# Patient Record
Sex: MALE | Race: WHITE | HISPANIC OR LATINO | Employment: FULL TIME | URBAN - METROPOLITAN AREA
[De-identification: names, ages, dates, MRNs, and addresses within clinical notes are randomized per-mention and may not be internally consistent; named-entity substitution may affect disease eponyms.]

---

## 2017-01-31 ENCOUNTER — HOSPITAL ENCOUNTER (EMERGENCY)
Facility: MEDICAL CENTER | Age: 27
End: 2017-01-31
Attending: EMERGENCY MEDICINE

## 2017-01-31 VITALS
TEMPERATURE: 99.6 F | RESPIRATION RATE: 18 BRPM | OXYGEN SATURATION: 96 % | WEIGHT: 160.94 LBS | HEART RATE: 121 BPM | HEIGHT: 69 IN | DIASTOLIC BLOOD PRESSURE: 88 MMHG | SYSTOLIC BLOOD PRESSURE: 137 MMHG | BODY MASS INDEX: 23.84 KG/M2

## 2017-01-31 DIAGNOSIS — M67.40 GANGLION CYST: ICD-10-CM

## 2017-01-31 PROCEDURE — 99283 EMERGENCY DEPT VISIT LOW MDM: CPT

## 2017-01-31 NOTE — ED AVS SNAPSHOT
1/31/2017          Matteo Ovalle  [address]  [place] [address]    Dear Matteo:    [facility] wants to ensure your discharge home is safe and you or your loved ones have had all your questions answered regarding your care after you leave the hospital.    You may receive a telephone call within two days of your discharge.  This call is to make certain you understand your discharge instructions as well as ensure we provided you with the best care possible during your stay with us.     The call will only last approximately 3-5 minutes and will be done by a nurse.    Once again, we want to ensure your discharge home is safe and that you have a clear understanding of any next steps in your care.  If you have any questions or concerns, please do not hesitate to contact us, we are here for you.  Thank you for choosing [facility] for your healthcare needs.    Sincerely,    Ryan Aviles    [facility]

## 2017-01-31 NOTE — DISCHARGE INSTRUCTIONS
Ganglion Cyst  A ganglion cyst is a noncancerous, fluid-filled lump that occurs near joints or tendons. The ganglion cyst grows out of a joint or the lining of a tendon. It most often develops in the hand or wrist, but it can also develop in the shoulder, elbow, hip, knee, ankle, or foot. The round or oval ganglion cyst can be the size of a pea or larger than a grape. Increased activity may enlarge the size of the cyst because more fluid starts to build up.   CAUSES  It is not known what causes a ganglion cyst to grow. However, it may be related to:  · Inflammation or irritation around the joint.  · An injury.  · Repetitive movements or overuse.  · Arthritis.  RISK FACTORS  Risk factors include:  · Being a woman.  · Being age 20-50.  SIGNS AND SYMPTOMS  Symptoms may include:   · A lump. This most often appears on the hand or wrist, but it can occur in other areas of the body.  · Tingling.  · Pain.  · Numbness.  · Muscle weakness.  · Weak .  · Less movement in a joint.  DIAGNOSIS  Ganglion cysts are most often diagnosed based on a physical exam. Your health care provider will feel the lump and may shine a light alongside it. If it is a ganglion cyst, a light often shines through it. Your health care provider may order an X-ray, ultrasound, or MRI to rule out other conditions.  TREATMENT  Ganglion cysts usually go away on their own without treatment. If pain or other symptoms are involved, treatment may be needed. Treatment is also needed if the ganglion cyst limits your movement or if it gets infected. Treatment may include:  · Wearing a brace or splint on your wrist or finger.  · Taking anti-inflammatory medicine.  · Draining fluid from the lump with a needle (aspiration).  · Injecting a steroid into the joint.  · Surgery to remove the ganglion cyst.  HOME CARE INSTRUCTIONS  · Do not press on the ganglion cyst, poke it with a needle, or hit it.  · Take medicines only as directed by your health care  provider.  · Wear your brace or splint as directed by your health care provider.  · Watch your ganglion cyst for any changes.  · Keep all follow-up visits as directed by your health care provider. This is important.  SEEK MEDICAL CARE IF:  · Your ganglion cyst becomes larger or more painful.  · You have increased redness, red streaks, or swelling.  · You have pus coming from the lump.  · You have weakness or numbness in the affected area.  · You have a fever or chills.     This information is not intended to replace advice given to you by your health care provider. Make sure you discuss any questions you have with your health care provider.     Document Released: 12/15/2001 Document Revised: 01/08/2016 Document Reviewed: 06/02/2015  Elsevier Interactive Patient Education ©2016 Elsevier Inc.

## 2017-01-31 NOTE — ED NOTES
Chief Complaint   Patient presents with   • Lump     L wrist for 1 year       Denies pain, cms intact

## 2017-01-31 NOTE — ED AVS SNAPSHOT
After Visit Summary                                                                                                                Matteo Ovalle   MRN: [id number]    Department:  [facility], Emergency Dept   Date of Visit:  1/31/2017            [facility], Emergency Dept    [address]    [facility] [address]    Phone:  [phone]      You were seen by     Franco Phillips M.D.      Your Diagnosis Was     Ganglion cyst     M67.40       Follow-up Information     1. Follow up with Jordon Beckford M.D..    Specialty:  Orthopaedics    Contact information    [address]  [address]  [facility] [id number] [phone]        Medication Information     Review all of your home medications and newly ordered medications with your primary doctor and/or pharmacist as soon as possible. Follow medication instructions as directed by your doctor and/or pharmacist.     Please keep your complete medication list with you and share with your physician. Update the information when medications are discontinued, doses are changed, or new medications (including over-the-counter products) are added; and carry medication information at all times in the event of emergency situations.               Medication List      ASK your doctor about these medications        Instructions    diphenhydrAMINE 25 MG Tabs   Commonly known as:  BENADRYL    Take 25-50 mg by mouth every 6 hours as needed.   Dose:  25-50 mg                 Discharge Instructions       Ganglion Cyst  A ganglion cyst is a noncancerous, fluid-filled lump that occurs near joints or tendons. The ganglion cyst grows out of a joint or the lining of a tendon. It most often develops in the hand or wrist, but it can also develop in the shoulder, elbow, hip, knee, ankle, or foot. The round or oval ganglion cyst can be the size of a pea or larger than a grape. Increased activity may enlarge the size of the cyst because more fluid starts to build up.      CAUSES  It is not known what causes a ganglion cyst to grow. However, it may be related to:  · Inflammation or irritation around the joint.  · An injury.  · Repetitive movements or overuse.  · Arthritis.  RISK FACTORS  Risk factors include:  · Being a woman.  · Being age 20-50.  SIGNS AND SYMPTOMS  Symptoms may include:   · A lump. This most often appears on the hand or wrist, but it can occur in other areas of the body.  · Tingling.  · Pain.  · Numbness.  · Muscle weakness.  · Weak .  · Less movement in a joint.  DIAGNOSIS  Ganglion cysts are most often diagnosed based on a physical exam. Your health care provider will feel the lump and may shine a light alongside it. If it is a ganglion cyst, a light often shines through it. Your health care provider may order an X-ray, ultrasound, or MRI to rule out other conditions.  TREATMENT  Ganglion cysts usually go away on their own without treatment. If pain or other symptoms are involved, treatment may be needed. Treatment is also needed if the ganglion cyst limits your movement or if it gets infected. Treatment may include:  · Wearing a brace or splint on your wrist or finger.  · Taking anti-inflammatory medicine.  · Draining fluid from the lump with a needle (aspiration).  · Injecting a steroid into the joint.  · Surgery to remove the ganglion cyst.  HOME CARE INSTRUCTIONS  · Do not press on the ganglion cyst, poke it with a needle, or hit it.  · Take medicines only as directed by your health care provider.  · Wear your brace or splint as directed by your health care provider.  · Watch your ganglion cyst for any changes.  · Keep all follow-up visits as directed by your health care provider. This is important.  SEEK MEDICAL CARE IF:  · Your ganglion cyst becomes larger or more painful.  · You have increased redness, red streaks, or swelling.  · You have pus coming from the lump.  · You have weakness or numbness in the affected area.  · You have a fever or chills.      This information is not intended to replace advice given to you by your health care provider. Make sure you discuss any questions you have with your health care provider.     Document Released: 12/15/2001 Document Revised: 01/08/2016 Document Reviewed: 06/02/2015  Elsevier Interactive Patient Education ©2016 [employer]            Patient Information     Patient Information    Following emergency treatment: all patient requiring follow-up care must return either to a private physician or a clinic if your condition worsens before you are able to obtain further medical attention, please return to the emergency room.     Billing Information    At [facility], we work to make the billing process streamlined for our patients.  Our Representatives are here to answer any questions you may have regarding your hospital bill.  If you have insurance coverage and have supplied your insurance information to us, we will submit a claim to your insurer on your behalf.  Should you have any questions regarding your bill, we can be reached online or by phone as follows:  Online: You are able pay your bills online or live chat with our representatives about any billing questions you may have. We are here to help Monday - Friday from 8:00am to 7:30pm and 9:00am - 12:00pm on Saturdays.  Please visit [email or web address]  for more information.   Phone:  [phone] or [phone]    Please note that your emergency physician, surgeon, pathologist, radiologist, anesthesiologist, and other specialists are not employed by [facility] and will therefore bill separately for their services.  Please contact them directly for any questions concerning their bills at the numbers below:     Emergency Physician Services:  [phone]  [facility]:  [phone]  [facility]:  [phone]  [facility]:  [phone]    1. Your final bill may vary from the amount  quoted upon discharge if all procedures are not complete at that time, or if your doctor has additional procedures of which we are not aware. You will receive an additional bill if you return to the Emergency Department at [facility] for suture removal regardless of the facility of which the sutures were placed.     2. Please arrange for settlement of this account at the emergency registration.    3. All self-pay accounts are due in full at the time of treatment.  If you are unable to meet this obligation then payment is expected within 4-5 days.     4. If you have had radiology studies (CT, X-ray, Ultrasound, MRI), you have received a preliminary result during your emergency department visit. Please contact the radiology department [phone] to receive a copy of your final result. Please discuss the Final result with your primary physician or with the follow up physician provided.     Crisis Hotline:  [place] Crisis Hotline:  1-406-RVJNBZD or 1-993.145.4326  [place] Crisis Hotline:    1-577.668.4353 or 869-505-4865         ED Discharge Follow Up Questions    1. In order to provide you with very good care, we would like to follow up with a phone call in the next few days.  May we have your permission to contact you?     YES /  NO    2. What is the best phone number to call you? (       )_____-__________    3. What is the best time to call you?      Morning  /  Afternoon  /  Evening                   Patient Signature:  ____________________________________________________________    Date:  ____________________________________________________________

## 2017-01-31 NOTE — ED NOTES
Discharge instructions & f/u appt rv'wd with Pt, he verbalizes understanding. Stable on discharge. Ambulated out of ER with family.

## 2017-01-31 NOTE — ED AVS SNAPSHOT
StormMQ Access Code: [email or web address]  Expires: 3/2/2017  1:24 PM    Your email address is not on file at [facility].  Email Addresses are required for you to sign up for StormMQ, please contact [phone] to verify your personal information and to provide your email address prior to attempting to register for StormMQ.    Matteo Vasquez  [address]  [place] [address]    [employer]  A secure, online tool to manage your health information     [facility]’s StormMQ® is a secure, online tool that connects you to your personalized health information from the privacy of your home -- day or night - making it very easy for you to manage your healthcare. Once the activation process is completed, you can even access your medical information using the StormMQ chloe, which is available for free in the Apple Chloe store or Google Play store.     To learn more about StormMQ, visit [email or web address]    There are two levels of access available (as shown below):   My Chart Features  [facility] Primary Care Doctor [facility]  Specialists [facility]  Urgent  Care Non-[facility] Primary Care Doctor   Email your healthcare team securely and privately 24/7 X X X    Manage appointments: schedule your next appointment; view details of past/upcoming appointments X      Request prescription refills. X      View recent personal medical records, including lab and immunizations X X X X   View health record, including health history, allergies, medications X X X X   Read reports about your outpatient visits, procedures, consult and ER notes X X X X   See your discharge summary, which is a recap of your hospital and/or ER visit that includes your diagnosis, lab results, and care plan X X  X     How to register for StormMQ:  Once your e-mail address has been verified, follow the following steps to sign up for [employer]techt.     1. Go to  [email or web address]  2. Click on the Sign Up Now box, which takes you to the New Member Sign Up page. You will  need to provide the following information:  a. Enter your [employer] Access Code exactly as it appears at the top of this page. (You will not need to use this code after you’ve completed the sign-up process. If you do not sign up before the expiration date, you must request a new code.)   b. Enter your date of birth.   c. Enter your home email address.   d. Click Submit, and follow the next screen’s instructions.  3. Create a ChannelEyest ID. This will be your Olive Software login ID and cannot be changed, so think of one that is secure and easy to remember.  4. Create a [employer] Software password. You can change your password at any time.  5. Enter your Password Reset Question and Answer. This can be used at a later time if you forget your password.   6. Enter your e-mail address. This allows you to receive e-mail notifications when new information is available in [employer].  7. Click Sign Up. You can now view your health information.    For assistance activating your [employer] account, call [phone]

## 2017-01-31 NOTE — ED PROVIDER NOTES
"ED Provider Note    Scribed for Franco Phillips M.D. by Rosa Cerna. 1/31/2017, 12:50 PM.    Primary care provider: Pcp Pt States None  Means of arrival: Private vehicle  History obtained from: Patient  History limited by: None    CHIEF COMPLAINT  Chief Complaint   Patient presents with   • Lump     L wrist for 1 year       HPI  Matteo Ovalle is a 26 y.o. male who presents to the Emergency Department for a lump located to the left wrist onset one year ago. Patient has some associated decrease in range of motion to left wrist. He denies pain and loss of sensation.      REVIEW OF SYSTEMS  Review of Systems   Skin:        Positive for swelling on the dorsum of the left wrist.  Negative for pain or loss of sensation.       PAST MEDICAL HISTORY  None noted    SURGICAL HISTORY   has past surgical history that includes finger orif (4/29/2011).    SOCIAL HISTORY  Social History   Substance Use Topics   • Smoking status: Never Smoker    •     • Alcohol Use: Yes      Comment: socially      History   Drug Use   • Yes     Comment: marijuana       CURRENT MEDICATIONS  Home Medications     **Home medications have not yet been reviewed for this encounter**          ALLERGIES  Allergies   Allergen Reactions   • Shrimp Flavor        PHYSICAL EXAM  VITAL SIGNS: /88 mmHg  Pulse 120  Temp(Src) 37.6 °C (99.6 °F) (Temporal)  Resp 18  Ht 1.753 m (5' 9.02\")  Wt 73 kg (160 lb 15 oz)  BMI 23.76 kg/m2  SpO2 97%    Constitutional: , Alert in no apparent distress.  HENT: Normocephalic, Bilateral external ears normal. Nose normal.   Eyes: Pupils are equal and reactive. Conjunctiva normal, non-icteric.   Thorax & Lungs: Easy unlabored respirations  Abdomen:  No gross signs of peritonitis, no pain with movement   Skin: Visualized skin is  Dry, No erythema, No rash.   Extremities: No asymmetry, 1.5cm swelling on dorsum of the left wrist consistent with ganglionic cyst, no erythema  Neurologic: Alert, Grossly non-focal.   Psychiatric: " Affect and Mood normal      COURSE & MEDICAL DECISION MAKING  Nursing notes, VS, PMSFHx reviewed in chart.    12:50 PM - Patient seen and examined at bedside. Patient was informed and understands that his symptoms are consistent with a ganglion cyst. He agrees to discharge home after splint application. I have discussed with the patient that he should follow up with a general surgeon. The patient will return for new or worsening symptoms and is stable at the time of discharge.        The patient is referred to a primary physician for blood pressure management, diabetic screening, and for all other preventative health concerns.    DISPOSITION:  Patient will be discharged home in stable condition.    FOLLOW UP:  Jordon Beckford M.D.  [address]  [address]  [facility] [address]  [phone]            OUTPATIENT MEDICATIONS:  New Prescriptions    No medications on file         FINAL IMPRESSION  1. Ganglion cyst          Rosa CASTRO (Александр), am scribing for, and in the presence of, Franco Phillips M.D..    Electronically signed by: Rosa Cerna (Александр), 1/31/2017    Franco CASTRO M.D. personally performed the services described in this documentation, as scribed by Rosa Cerna in my presence, and it is both accurate and complete.    The note accurately reflects work and decisions made by me.  Franco Phillips  1/31/2017  5:11 PM

## 2019-04-01 ENCOUNTER — HOSPITAL ENCOUNTER (EMERGENCY)
Facility: MEDICAL CENTER | Age: 29
End: 2019-04-01
Attending: EMERGENCY MEDICINE
Payer: MEDICAID

## 2019-04-01 VITALS
HEART RATE: 96 BPM | WEIGHT: 175.71 LBS | HEIGHT: 69 IN | RESPIRATION RATE: 18 BRPM | SYSTOLIC BLOOD PRESSURE: 129 MMHG | DIASTOLIC BLOOD PRESSURE: 89 MMHG | TEMPERATURE: 98.9 F | OXYGEN SATURATION: 96 % | BODY MASS INDEX: 26.02 KG/M2

## 2019-04-01 DIAGNOSIS — J36 PERITONSILLAR CELLULITIS: ICD-10-CM

## 2019-04-01 DIAGNOSIS — J02.9 PHARYNGITIS, UNSPECIFIED ETIOLOGY: ICD-10-CM

## 2019-04-01 PROCEDURE — 96372 THER/PROPH/DIAG INJ SC/IM: CPT

## 2019-04-01 PROCEDURE — 99284 EMERGENCY DEPT VISIT MOD MDM: CPT

## 2019-04-01 PROCEDURE — A9270 NON-COVERED ITEM OR SERVICE: HCPCS | Performed by: EMERGENCY MEDICINE

## 2019-04-01 PROCEDURE — 700102 HCHG RX REV CODE 250 W/ 637 OVERRIDE(OP): Performed by: EMERGENCY MEDICINE

## 2019-04-01 PROCEDURE — 700111 HCHG RX REV CODE 636 W/ 250 OVERRIDE (IP): Performed by: EMERGENCY MEDICINE

## 2019-04-01 RX ORDER — CLINDAMYCIN HYDROCHLORIDE 150 MG/1
300 CAPSULE ORAL 3 TIMES DAILY
Qty: 42 CAP | Refills: 0 | Status: SHIPPED | OUTPATIENT
Start: 2019-04-01

## 2019-04-01 RX ORDER — DEXAMETHASONE SODIUM PHOSPHATE 4 MG/ML
6 INJECTION, SOLUTION INTRA-ARTICULAR; INTRALESIONAL; INTRAMUSCULAR; INTRAVENOUS; SOFT TISSUE ONCE
Status: COMPLETED | OUTPATIENT
Start: 2019-04-01 | End: 2019-04-01

## 2019-04-01 RX ORDER — IBUPROFEN 600 MG/1
600 TABLET ORAL ONCE
Status: COMPLETED | OUTPATIENT
Start: 2019-04-01 | End: 2019-04-01

## 2019-04-01 RX ORDER — CLINDAMYCIN HYDROCHLORIDE 150 MG/1
300 CAPSULE ORAL ONCE
Status: COMPLETED | OUTPATIENT
Start: 2019-04-01 | End: 2019-04-01

## 2019-04-01 RX ADMIN — DEXAMETHASONE SODIUM PHOSPHATE 6 MG: 4 INJECTION, SOLUTION INTRAMUSCULAR; INTRAVENOUS at 14:05

## 2019-04-01 RX ADMIN — IBUPROFEN 600 MG: 600 TABLET ORAL at 14:05

## 2019-04-01 RX ADMIN — CLINDAMYCIN HYDROCHLORIDE 300 MG: 150 CAPSULE ORAL at 14:05

## 2019-04-01 NOTE — ED PROVIDER NOTES
"ED Provider Note    Scribed for Vincent Miller M.D. by Celso Hunter. 4/1/2019  1:51 PM    Primary care provider: Pcp Pt States None  Means of arrival: Private vehicle  History obtained from: Patient  History limited by: None    CHIEF COMPLAINT  Chief Complaint   Patient presents with   • Sore Throat     painful to swallow.         HPI  Matteo Oavlle is a 28 y.o. male who presents to the Emergency Department for evaluation of a sore throat beginning one week ago. Patient endorses associated radiating left sided jaw and ear pain, and painful swallowing. He denies recent sick contact. He denies fever, chills, cough or rhinorrhea    REVIEW OF SYSTEMS  Pertinent negatives include no fever, chills, cough or rhinorrhea.      SURGICAL HISTORY   has a past surgical history that includes finger orif (4/29/2011).    SOCIAL HISTORY  Social History   Substance Use Topics   • Smoking status: Never Smoker   • Smokeless tobacco: Not on file   • Alcohol use Yes      Comment: socially      History   Drug Use     Comment: marijuana     CURRENT MEDICATIONS  Home Medications    **Home medications have not yet been reviewed for this encounter**         ALLERGIES  Allergies   Allergen Reactions   • Shrimp Flavor        PHYSICAL EXAM  VITAL SIGNS: /81   Pulse 100   Temp 37.1 °C (98.7 °F) (Temporal)   Resp 16   Ht 1.753 m (5' 9\")   Wt 79.7 kg (175 lb 11.3 oz)   SpO2 96%   BMI 25.95 kg/m²     Constitutional: Well developed, Well nourished, No acute distress, Non-toxic appearance.   HENT: Cellulitic change of the left peritonsillar region with swelling of the left greater than right posterior oropharynx with erythema and no exudates, slight uvular deviation from left to right   Positive left-sided submandibular lymphadenopathy that is tender  Lungs: CTAB, No stridor  Heart: Regular rate and rhythm. No Murmur  Lymphatic: LAD on left side only    COURSE & MEDICAL DECISION MAKING  Nursing notes, VS, PMSFHx reviewed in chart.    1:51 PM " Patient seen and examined at bedside. Patient was treated with Clindamycin 150 mg, Motrin 600mg and Decadron 6mg for his symptoms.  Discussed with patient the need for ENT appointment if the cellulitis appears to be worsening as this may indicate that it is turning into an abscess.  The patient understands and agrees with the plan and discharge    Patient has had high blood pressure while in the emergency department, felt likely secondary to medical condition. Counseled patient to monitor blood pressure at home and follow up with primary care physician.      The patient will return for new or worsening symptoms and is stable at the time of discharge.  He understands this may very well turn into an abscess that would later require incision and drainage.  He is referred to ear nose and throat for follow-up and can return here if significant change    DISPOSITION:  Patient will be discharged home in stable condition.    OUTPATIENT MEDICATIONS:  New Prescriptions    CLINDAMYCIN (CLEOCIN) 150 MG CAP    Take 2 Caps by mouth 3 times a day.       FINAL IMPRESSION  1. Pharyngitis, unspecified etiology        Celso CASTRO (Scribe), am scribing for, and in the presence of, Vincent Miller M.D..    Electronically signed by: Celso Hunter (Scribe), 4/1/2019    IVincent M.D. personally performed the services described in this documentation, as scribed by Celso Hunter in my presence, and it is both accurate and complete.    The note accurately reflects work and decisions made by me.  Vincent Miller  4/1/2019  5:24 PM

## 2019-04-01 NOTE — ED TRIAGE NOTES
Chief Complaint   Patient presents with   • Sore Throat     painful to swallow.       Triage process explained to patient.  Pt back to waiting room.  Pt instructed to inform RN if any changes or questions arise.

## 2024-11-22 ENCOUNTER — HOSPITAL ENCOUNTER (EMERGENCY)
Facility: MEDICAL CENTER | Age: 34
End: 2024-11-22
Attending: EMERGENCY MEDICINE
Payer: MEDICAID

## 2024-11-22 VITALS
TEMPERATURE: 97 F | HEART RATE: 74 BPM | HEIGHT: 69 IN | SYSTOLIC BLOOD PRESSURE: 128 MMHG | WEIGHT: 178.79 LBS | OXYGEN SATURATION: 96 % | RESPIRATION RATE: 16 BRPM | DIASTOLIC BLOOD PRESSURE: 82 MMHG | BODY MASS INDEX: 26.48 KG/M2

## 2024-11-22 DIAGNOSIS — L50.9 HIVES: ICD-10-CM

## 2024-11-22 DIAGNOSIS — T78.40XA ALLERGIC REACTION, INITIAL ENCOUNTER: ICD-10-CM

## 2024-11-22 PROCEDURE — 700111 HCHG RX REV CODE 636 W/ 250 OVERRIDE (IP): Mod: UD | Performed by: EMERGENCY MEDICINE

## 2024-11-22 PROCEDURE — 700102 HCHG RX REV CODE 250 W/ 637 OVERRIDE(OP): Mod: UD | Performed by: EMERGENCY MEDICINE

## 2024-11-22 PROCEDURE — 99283 EMERGENCY DEPT VISIT LOW MDM: CPT

## 2024-11-22 PROCEDURE — A9270 NON-COVERED ITEM OR SERVICE: HCPCS | Mod: UD | Performed by: EMERGENCY MEDICINE

## 2024-11-22 RX ORDER — PREDNISONE 20 MG/1
40 TABLET ORAL DAILY
Qty: 8 TABLET | Refills: 0 | Status: SHIPPED | OUTPATIENT
Start: 2024-11-22 | End: 2024-11-26

## 2024-11-22 RX ORDER — FAMOTIDINE 20 MG/1
20 TABLET, FILM COATED ORAL ONCE
Status: COMPLETED | OUTPATIENT
Start: 2024-11-22 | End: 2024-11-22

## 2024-11-22 RX ORDER — DIPHENHYDRAMINE HCL 25 MG
25 TABLET ORAL ONCE
Status: COMPLETED | OUTPATIENT
Start: 2024-11-22 | End: 2024-11-22

## 2024-11-22 RX ORDER — FAMOTIDINE 20 MG/1
20 TABLET, FILM COATED ORAL 2 TIMES DAILY
Qty: 10 TABLET | Refills: 0 | Status: SHIPPED | OUTPATIENT
Start: 2024-11-22 | End: 2024-11-26

## 2024-11-22 RX ORDER — PREDNISONE 20 MG/1
40 TABLET ORAL ONCE
Status: COMPLETED | OUTPATIENT
Start: 2024-11-22 | End: 2024-11-22

## 2024-11-22 RX ADMIN — DIPHENHYDRAMINE HYDROCHLORIDE 25 MG: 25 TABLET ORAL at 16:21

## 2024-11-22 RX ADMIN — PREDNISONE 40 MG: 20 TABLET ORAL at 16:21

## 2024-11-22 RX ADMIN — FAMOTIDINE 20 MG: 20 TABLET, FILM COATED ORAL at 16:17

## 2024-11-22 NOTE — Clinical Note
Matteo Ovalle was seen and treated in our emergency department on 11/22/2024.  He may return to work on 11/25/2024.       If you have any questions or concerns, please don't hesitate to call.      Colette Olson D.O.

## 2024-11-22 NOTE — ED TRIAGE NOTES
".  Chief Complaint   Patient presents with    Allergic Reaction     Generalized rash, itching x 1 week. Benadryl/Cortisone unhelpful. Denies taking any new medications.        33 yo male ambulatory to triage for above complaint. No respiratory distress.      Pt is alert and oriented, speaking in full sentences, follows commands and responds appropriately to questions.      Patient placed back in lobby and educated on triage process. Asked to inform RN of any changes or concerns.     BP (!) 136/96   Pulse 70   Temp 36.1 °C (97 °F)   Resp 18   Ht 1.753 m (5' 9\")   Wt 81.1 kg (178 lb 12.7 oz)   SpO2 98%   BMI 26.40 kg/m²     "

## 2024-11-23 NOTE — ED NOTES
Pt ready for discharge. Discharge education was provided to pt by this RN. Pt verbalized understanding & all questions were answered. Pt AoX 4. Pt ambulated out of the ED with all belongings. Pt encouraged to come back if symptoms worsen.

## 2024-11-23 NOTE — ED PROVIDER NOTES
ED Provider Note    CHIEF COMPLAINT  Chief Complaint   Patient presents with    Allergic Reaction     Generalized rash, itching x 1 week. Benadryl/Cortisone unhelpful. Denies taking any new medications.        EXTERNAL RECORDS REVIEWED  Patient's last encounter was an ED visit in April 2019 and received peritonsillar cellulitis and pharyngitis.    HPI/ROS  LIMITATION TO HISTORY   Select: : None  OUTSIDE HISTORIAN(S):  None    Matteo Ovalle is a 34 y.o. male who presents to the emergency department accompanied by his partner with a chief complaint of an allergic reaction.  Patient has diffuse hives.  They started on Wednesday last week.  He denies difficulty swallowing, difficulty breathing.  No history of wheezing or high fever.  He cannot decipher the trigger.  He did have a break, this week, Monday and Tuesday after having a week off, his symptoms improved but then returned on Wednesday.  Has been taking Benadryl with mild improvement.    PAST MEDICAL HISTORY   has a past medical history of Patient denies medical problems.    SURGICAL HISTORY   has a past surgical history that includes orif, finger (4/29/2011).    FAMILY HISTORY  History reviewed. No pertinent family history.    SOCIAL HISTORY  Social History     Tobacco Use    Smoking status: Never    Smokeless tobacco: Not on file   Vaping Use    Vaping status: Never Used   Substance and Sexual Activity    Alcohol use: Yes     Comment: Monthly    Drug use: Yes     Comment: marijuana    Sexual activity: Not on file       CURRENT MEDICATIONS  Home Medications       Reviewed by Sherley Maddox R.N. (Registered Nurse) on 11/22/24 at 1529  Med List Status: Partial     Medication Last Dose Status   clindamycin (CLEOCIN) 150 MG Cap  Active   diphenhydrAMINE (BENADRYL) 25 MG TABS  Active                    ALLERGIES  Allergies   Allergen Reactions    Shrimp Flavor        PHYSICAL EXAM  VITAL SIGNS: BP (!) 136/96   Pulse 70   Temp 36.1 °C (97 °F)   Resp 18   Ht 1.753 m  "(5' 9\")   Wt 81.1 kg (178 lb 12.7 oz)   SpO2 98%   BMI 26.40 kg/m²    Vitals reviewed.  Constitutional: Patient is oriented to person, place, and time. Appears well-developed and well-nourished. No distress.    Head: Normocephalic and atraumatic.   Mouth/Throat: Oropharynx is clear and moist  Eyes: Conjunctivae are normal.   Neck: Normal range of motion.   Cardiovascular: Normal rate, regular rhythm and normal heart sounds.   Pulmonary/Chest: Effort normal and breath sounds normal. No respiratory distress, no wheezes, rhonchi, or rales.   Abdominal: Soft. Bowel sounds are normal. There is no tenderness  Musculoskeletal: No edema and no tenderness.   Neurological: Normal gait. No focal deficits.   Skin: Skin is warm and dry. No erythema, except as noted.  Patient has diffuse hives over his trunk, upper and lower extremities. No pallor.   Psychiatric: Patient has a normal mood and affect.     EKG/LABS    RADIOLOGY/PROCEDURES     COURSE & MEDICAL DECISION MAKING    ASSESSMENT, COURSE AND PLAN  Care Narrative:     This is a pleasant and overall well-appearing 34-year-old male.  He has reassuring vital signs.  He has diffuse hives.  He has had them for more than a week sparing a few days and they are.  He is unsure what is called saying them.  He has been taking Benadryl for the most part, 25 mg of the last night as he does admit to taking 2 tablets, 50 mg and he is advised that this is a normal adult dose and he can continue to do this.  He will be started on prednisone as well as Pepcid.  He has been applying a cortisone cream which has been helpful with the itching.  He has no increased work of breathing.  He satting 99% on room air.  His blood pressure is reassuring.  He is encouraged to continue to try to uncover cause.  Otherwise, symptoms managed as above.  He is well-appearing overall and nontoxic.  Anticipate discharge to home shortly once an antihistamine also..    ADDITIONAL PROBLEMS MANAGED    DISPOSITION " AND DISCUSSIONS  I have discussed management of the patient with the following physicians and VIVIAN's:  None    Discussion of management with other Q or appropriate source(s): None     Escalation of care considered, and ultimately not performed: None    Barriers to care at this time, including but not limited to: None.     Decision tools and prescription drugs considered including, but not limited to: None.    FINAL DIAGNOSIS  1. Allergic reaction, initial encounter    2. Hives         Electronically signed by: Colette Olson D.O., 11/22/2024 4:16 PM

## 2024-11-26 ENCOUNTER — HOSPITAL ENCOUNTER (EMERGENCY)
Facility: MEDICAL CENTER | Age: 34
End: 2024-11-26
Attending: EMERGENCY MEDICINE
Payer: MEDICAID

## 2024-11-26 VITALS
WEIGHT: 180.78 LBS | TEMPERATURE: 98.9 F | OXYGEN SATURATION: 96 % | SYSTOLIC BLOOD PRESSURE: 125 MMHG | HEART RATE: 70 BPM | RESPIRATION RATE: 18 BRPM | BODY MASS INDEX: 26.78 KG/M2 | HEIGHT: 69 IN | DIASTOLIC BLOOD PRESSURE: 86 MMHG

## 2024-11-26 DIAGNOSIS — L50.9 HIVES: ICD-10-CM

## 2024-11-26 PROCEDURE — 99282 EMERGENCY DEPT VISIT SF MDM: CPT

## 2024-11-26 RX ORDER — PREDNISONE 20 MG/1
40 TABLET ORAL DAILY
Qty: 10 TABLET | Refills: 0 | Status: SHIPPED | OUTPATIENT
Start: 2024-11-26 | End: 2024-12-01

## 2024-11-26 RX ORDER — FAMOTIDINE 20 MG/1
20 TABLET, FILM COATED ORAL 2 TIMES DAILY
Qty: 28 TABLET | Refills: 0 | Status: SHIPPED | OUTPATIENT
Start: 2024-11-26 | End: 2024-12-10

## 2024-11-27 NOTE — ED PROVIDER NOTES
ED Provider Note    CHIEF COMPLAINT  Chief Complaint   Patient presents with    Rash     Patient reports rash on arms, chest, and legs. Seen 4 days ago with no improvement in rash.        EXTERNAL RECORDS REVIEWED  Other seen in our emergency department on 11/22 for acute hives    HPI/ROS  LIMITATION TO HISTORY   Select: : None  OUTSIDE HISTORIAN(S):  ester Ovalle is a 34 y.o. male who presents to the emergency department with continued hives.  He states the steroids and the Pepcid are really helping however unfortunately he had to go back to work yesterday in the 2 days while he was at home he was doing well after he took the meds in the morning but now that he is been back to work the last 2 days they have been recurring in the afternoon.  He is unsure if it something he is being exposed to at work and other peoples lunches but he did get new clubs he denies again any change in cosmetics or his own diet.  He denies shortness of breath throat swelling nausea or vomiting.  He is just here requesting another round of the steroids while he continues to determine what is causing his allergic reaction.    PAST MEDICAL HISTORY   has a past medical history of Patient denies medical problems.    SURGICAL HISTORY   has a past surgical history that includes orif, finger (4/29/2011).    FAMILY HISTORY  History reviewed. No pertinent family history.    SOCIAL HISTORY  Social History     Tobacco Use    Smoking status: Never    Smokeless tobacco: Not on file   Vaping Use    Vaping status: Never Used   Substance and Sexual Activity    Alcohol use: Yes     Comment: Monthly    Drug use: Yes     Comment: marijuana    Sexual activity: Not on file       CURRENT MEDICATIONS  Home Medications       Reviewed by Colette Burleson R.N. (Registered Nurse) on 11/26/24 at 1757  Med List Status: Partial     Medication Last Dose Status   clindamycin (CLEOCIN) 150 MG Cap  Active   diphenhydrAMINE (BENADRYL) 25 MG TABS  Active   famotidine  "(PEPCID) 20 MG Tab  Active   predniSONE (DELTASONE) 20 MG Tab  Active                    ALLERGIES  Allergies   Allergen Reactions    Shrimp Flavor        PHYSICAL EXAM  VITAL SIGNS: BP (!) 140/92   Pulse 77   Temp 36.6 °C (97.8 °F) (Temporal)   Resp 16   Ht 1.753 m (5' 9\")   Wt 82 kg (180 lb 12.4 oz)   SpO2 97%   BMI 26.70 kg/m²    Pulse OX: Pulse Oxygen level is within normal limits  Constitutional: Alert in no apparent distress.  HENT: Normocephalic, Atraumatic, MMM oropharynx clear uvula midline without edema  Eyes: PERound. Conjunctiva normal, non-icteric.   Heart: Regular rate and rhythm, intact distal pulses   Lungs: Symmetrical movement, no resp distress   Skin: Warm, Dry, No erythema, scattered hives seen throughout the extremities  Neurologic: Alert and oriented, Grossly non-focal.       COURSE & MEDICAL DECISION MAKING    ASSESSMENT, COURSE AND PLAN/ DISPOSITION AND DISCUSSIONS  Care Narrative:     Patient is a 34-year-old male presents to the emergency department with hives without any other signs of anaphylaxis.  We discussed wearing a mask at work and potentially just continue to wear gloves the entire time he is there to reduce his exposure.  He will be given a few days more of the Pepcid as well as the steroids he does not require an EpiPen there is no signs of anaphylaxis and he feels comfortable going home and will continue to take 50 mg of Benadryl as needed    I have discussed management of the patient with the following physicians and VIVIAN's:  none    Discussion of management with other QHP or appropriate source(s): None     Escalation of care considered, and ultimately not performed:Laboratory analysis    Barriers to care at this time, including but not limited to:  na .     Decision tools and prescription drugs considered including, but not limited to:  prednisone, pepcid .    The patient will return for new or worsening symptoms and is stable at the time of discharge.    The patient is " referred to a primary physician for blood pressure management, diabetic screening, and for all other preventative health concerns.    DISPOSITION:  Patient will be discharged home in stable condition.    FOLLOW UP:  [facility], Emergency Dept  [address] [address] [phone]    If symptoms worsen      OUTPATIENT MEDICATIONS:  Discharge Medication List as of 11/26/2024  6:40 PM            FINAL DIAGNOSIS  1. Hives         Electronically signed by: Ofe Charles M.D., 11/26/2024 6:21 PM

## 2024-11-27 NOTE — ED TRIAGE NOTES
Chief Complaint   Patient presents with    Rash     Patient reports rash on arms, chest, and legs. Seen 4 days ago with no improvement in rash.